# Patient Record
Sex: MALE | Race: WHITE
[De-identification: names, ages, dates, MRNs, and addresses within clinical notes are randomized per-mention and may not be internally consistent; named-entity substitution may affect disease eponyms.]

---

## 2017-12-30 ENCOUNTER — HOSPITAL ENCOUNTER (EMERGENCY)
Dept: HOSPITAL 89 - ER | Age: 22
Discharge: HOME | End: 2017-12-30
Payer: COMMERCIAL

## 2017-12-30 VITALS
HEIGHT: 60 IN | WEIGHT: 235 LBS | WEIGHT: 235 LBS | BODY MASS INDEX: 46.13 KG/M2 | HEIGHT: 60 IN | BODY MASS INDEX: 46.13 KG/M2

## 2017-12-30 VITALS — SYSTOLIC BLOOD PRESSURE: 133 MMHG | DIASTOLIC BLOOD PRESSURE: 71 MMHG

## 2017-12-30 DIAGNOSIS — B34.9: Primary | ICD-10-CM

## 2017-12-30 DIAGNOSIS — R59.1: ICD-10-CM

## 2017-12-30 LAB — PLATELET COUNT, AUTOMATED: 169 K/UL (ref 150–450)

## 2017-12-30 PROCEDURE — 82947 ASSAY GLUCOSE BLOOD QUANT: CPT

## 2017-12-30 PROCEDURE — 82247 BILIRUBIN TOTAL: CPT

## 2017-12-30 PROCEDURE — 87502 INFLUENZA DNA AMP PROBE: CPT

## 2017-12-30 PROCEDURE — 82040 ASSAY OF SERUM ALBUMIN: CPT

## 2017-12-30 PROCEDURE — 85025 COMPLETE CBC W/AUTO DIFF WBC: CPT

## 2017-12-30 PROCEDURE — 96360 HYDRATION IV INFUSION INIT: CPT

## 2017-12-30 PROCEDURE — 87040 BLOOD CULTURE FOR BACTERIA: CPT

## 2017-12-30 PROCEDURE — 86140 C-REACTIVE PROTEIN: CPT

## 2017-12-30 PROCEDURE — 82435 ASSAY OF BLOOD CHLORIDE: CPT

## 2017-12-30 PROCEDURE — 71020: CPT

## 2017-12-30 PROCEDURE — 81001 URINALYSIS AUTO W/SCOPE: CPT

## 2017-12-30 PROCEDURE — 84520 ASSAY OF UREA NITROGEN: CPT

## 2017-12-30 PROCEDURE — 84075 ASSAY ALKALINE PHOSPHATASE: CPT

## 2017-12-30 PROCEDURE — 99284 EMERGENCY DEPT VISIT MOD MDM: CPT

## 2017-12-30 PROCEDURE — 84460 ALANINE AMINO (ALT) (SGPT): CPT

## 2017-12-30 PROCEDURE — 84132 ASSAY OF SERUM POTASSIUM: CPT

## 2017-12-30 PROCEDURE — 86703 HIV-1/HIV-2 1 RESULT ANTBDY: CPT

## 2017-12-30 PROCEDURE — 82374 ASSAY BLOOD CARBON DIOXIDE: CPT

## 2017-12-30 PROCEDURE — 84295 ASSAY OF SERUM SODIUM: CPT

## 2017-12-30 PROCEDURE — 84450 TRANSFERASE (AST) (SGOT): CPT

## 2017-12-30 PROCEDURE — 82565 ASSAY OF CREATININE: CPT

## 2017-12-30 PROCEDURE — 82310 ASSAY OF CALCIUM: CPT

## 2017-12-30 PROCEDURE — 84155 ASSAY OF PROTEIN SERUM: CPT

## 2017-12-30 NOTE — RADIOLOGY IMAGING REPORT
FACILITY: [facility] 

 

PATIENT NAME: Epi Cook

: 1995

MR: [id number]

V: [id number]

EXAM DATE: 

ORDERING PHYSICIAN: JOÃO SAMUELS

TECHNOLOGIST: 

 

Location: [facility]

Patient: Epi Cook

: 1995

MRN: [id number]

Visit/Account:[id number]

Date of Sevice: 2017

 

ACCESSION #: [id number]

 

Examination: CHEST PA AND LAT

 

Comparison: None.

 

History: Fever and cough.

 

Findings: No consolidation, nodule, or peribronchial inflammation. No pneumothorax, edema, or effusio
n. Cardiac and hilar contour size is within normal limits. Osseous structures are intact.

 

IMPRESSION:

 

Negative chest.

 

Report Dictated By: Rogers Perkins MD at 2017 8:11 PM

 

Report E-Signed By: Rogers Perkins MD  at 2017 8:12 PM

 

WSN:[id number]

## 2017-12-30 NOTE — ER REPORT
History and Physical


Time Seen By MD:  18:57


Hx. of Stated Complaint:  


PT FEELING FEVERISH WITH CHILLS PAST 3 WEEKS, COUGHING UP YELLOW MUCUS, NASAL 


DISCHARGE, ON AUGMENTIN FROM URGENT CARE


HPI/ROS


CHIEF COMPLAINT: Fever





HISTORY OF PRESENT ILLNESS: 22-year-old male patient presents to the emergency 

room with complaint of a fever. Patient states that he hasn't seen multiple 

times for this. States that he has not had a fever until tonight. Prior to that 

he is having night sweats. He is also mostly has enlarged lymph nodes. He 

states that they are large however there non-tender. He denies any nausea, 

vomiting or diarrhea. He states that he has most recently been placed on 

Augmentin for possible sinusitis. He states that his heart rate is racing, that 

he does feel short of breath. He states that blood pressure is worse with 

orthostatics. He states that he had a mono test done yesterday which was 

negative. He does have a history of Joe-Infante in the past. Patient states he 

has had some body aches for the last couple of days. He did get a influenza 

vaccine.





REVIEW OF SYSTEMS:


Respiratory: As noted above


Cardiovascular: No chest pain, no palpitations.


Gastrointestinal: No vomiting, no abdominal pain.


Musculoskeletal: No back pain.


Allergies:  


Coded Allergies:  


     No Known Drug Allergies (Verified , 11/1/16)


Home Meds


Active Scripts


Ketorolac Tromethamine (KETOROLAC TROMETHAMINE) 10 Mg Tab, 10 MG PO Q6H, #20 TAB


   Prov:ABRILJOÃO FNP         12/30/17


Reported Medications


Naproxen (NAPROXEN) 500 Mg Tablet


   12/30/17


Amoxicillin/Potassium Clav (AMOX TR-K -125 MG TAB) 1 Each Tablet


   12/30/17


Discontinued Reported Medications


Prednisone (PREDNISONE) 20 Mg Tablet


   12/30/17


Discontinued Scripts


Prednisone 10 Mg Tab (PREDNISONE 10 MG TAB) 10 Mg Tablet, 10 MG PO QDAY, #10 TAB


   4 pills a day for one day, then 3 pills a day for one day, then 2


   pills a day for one day, then 1 pill a day for the last day.


   Prov:MAGUE MOORE MD         6/30/15


Hydrocodone Bit/Acetaminophen (HYDROCODON-ACETAMINOPHEN 5-325) 1 Each Tablet, 1 

EACH PO Q4-6H Y for pain, #0  


   Prov:MAGUE MOORE MD         6/30/15


Moxifloxacin HCl (Moxifloxacin HCl) 400 Mg Tablet, 400 MG PO QDAY, #10  


   Prov:MAGUE MOORE MD         6/30/15


Clindamycin Hcl (CLINDAMYCIN HCL) 300 Mg Capsule, 300 MG PO TID, #30 CAPSULE


   Take the 150mg and 300mg tablets together to make 450mg total.


   Prov:MAGUE MOORE MD         6/30/15


Clindamycin Hcl (CLINDAMYCIN HCL) 150 Mg Capsule, 150 MG PO TID, #30 CAPSULE


   Take the 150mg and 300mg tablets together to make 450mg total.


   Prov:MAGUE MOORE MD         6/30/15


Past Medical/Surgical History


Patient has a past medical history of astigmatism.


Patient has surgical history of tonsillectomy, left knee surgery.


Reviewed Nurses Notes:  Yes


Hx Smoking:  No


Smoking Status:  Never Smoker


Constitutional





Vital Sign - Last 24 Hours








 12/30/17 12/30/17 12/30/17 12/30/17





 18:54 18:58 19:05 19:20


 


Temp 99.2   


 


Pulse 115  105 ???


 


Resp 20   


 


B/P (MAP) 157/91 157/91 (113)  


 


Pulse Ox 95  97 94


 


O2 Delivery Room Air   


 


    





 12/30/17 12/30/17 12/30/17 12/30/17





 19:35 19:40 19:50 20:29


 


Temp    98.6


 


Pulse 105  100 


 


B/P (MAP)  123/77 (92)  


 


Pulse Ox 93  94 














Intake and Output   


 


 12/30/17 12/30/17 12/31/17





 15:00 23:00 07:00


 


Intake Total  1000 ml 


 


Balance  1000 ml 








Physical Exam


  General Appearance: The patient is alert, has no immediate need for airway 

protection and no current signs of toxicity.


ENT: Tympanic membranes are pearly-gray, auditory canals are patent, mucous 

membranes are moist.


Respiratory: Chest is non tender, lungs are clear to auscultation.


Cardiac: regular rhythm, patient is tachycardic.


Gastrointestinal: Abdomen is soft and non tender, no masses, bowel sounds 

normal.


Musculoskeletal:  Neck: Neck is supple and non tender.


   Extremities have full range of motion and are non tender.


Skin: No rashes or lesions.





DIFFERENTIAL DIAGNOSIS: After history and physical exam differential diagnosis 

was considered for fever in adults including but not limited to pneumonia, 

urinary tract infection, viral syndrome, and influenza.





Medical Decision Making


Data Points


Result Diagram:  


12/30/17 1922 12/30/17 1922





Laboratory





Hematology








Test


  12/30/17


18:51 12/30/17


19:22


 


Urine Color Vera  


 


Urine Clarity Cloudy  


 


Urine pH


  5.0 pH


(4.8-9.5) 


 


 


Urine Specific Gravity 1.031  


 


Urine Protein


  Negative mg/dL


(NEGATIVE) 


 


 


Urine Glucose (UA)


  Negative mg/dL


(NEGATIVE) 


 


 


Urine Ketones


  Negative mg/dL


(NEGATIVE) 


 


 


Urine Blood


  Negative


(NEGATIVE) 


 


 


Urine Nitrite


  Negative


(NEGATIVE) 


 


 


Urine Bilirubin


  Negative


(NEGATIVE) 


 


 


Urine Urobilinogen


  Negative mg/dL


(0.2-1.9) 


 


 


Urine Leukocyte Esterase


  Negative


(NEGATIVE) 


 


 


Urine RBC


  <1 /HPF


(0-2/HPF) 


 


 


Urine WBC


  3 /HPF


(0-5/HPF) 


 


 


Urine Squamous Epithelial


Cells Few /LPF


(</=FEW) 


 


 


Urine Calcium Oxalate Crystals


  Few /HPF


(NONE) 


 


 


Urine Bacteria


  Negative /HPF


(NONE-FEW) 


 


 


Urine Mucus


  Few /HPF


(NONE-FEW) 


 


 


Red Blood Count


  


  5.38 M/uL


(4.00-5.60)


 


Mean Corpuscular Volume


  


  87.0 fL


(80.0-96.0)


 


Mean Corpuscular Hemoglobin


  


  30.3 pg


(26.0-33.0)


 


Mean Corpuscular Hemoglobin


Concent 


  34.9 g/dL


(32.0-36.0)


 


Red Cell Distribution Width


  


  12.9 %


(11.5-14.5)


 


Mean Platelet Volume


  


  9.1 fL


(7.2-11.1)


 


Neutrophils (%) (Auto)   % (39.4-72.5) 


 


Lymphocytes (%) (Auto)   % (17.6-49.6) 


 


Monocytes (%) (Auto)   % (4.1-12.4) 


 


Eosinophils (%) (Auto)   % (0.4-6.7) 


 


Basophils (%) (Auto)   % (0.3-1.4) 


 


Nucleated RBC Relative Count


(auto) 


   /100WBC 


 


 


Neutrophils # (Auto)


  


  K/uL


(2.0-7.4)


 


Lymphocytes # (Auto)


  


  K/uL


(1.3-3.6)


 


Monocytes # (Auto)


  


  K/uL


(0.3-1.0)


 


Eosinophils # (Auto)


  


  K/uL


(0.0-0.5)


 


Basophils # (Auto)


  


  K/uL


(0.0-0.1)


 


Nucleated RBC Absolute Count


(auto) 


   K/uL 


 


 


Neutrophils % (Manual)


  


  61 %


(39.4-72.5)


 


Lymphocytes % (Manual)


  


  21 %


(17.6-49.6)


 


Atypical Lymphocytes %  5 % 


 


Monocytes % (Manual)


  


  10 %


(4.1-12.4)


 


Eosinophils % (Manual)  3 % (0.4-6.7) 


 


Basophils % (Manual)  0 % (0.3-1.4) 


 


Crete Cells  1+ 


 


Sodium Level


  


  140 mmol/L


(137-145)


 


Potassium Level


  


  3.8 mmol/L


(3.5-5.0)


 


Chloride Level


  


  105 mmol/L


()


 


Carbon Dioxide Level


  


  25 mmol/L


(22-30)


 


Blood Urea Nitrogen


  


  11 mg/dl


(9-21)


 


Creatinine


  


  0.80 mg/dl


(0.66-1.25)


 


Glomerular Filtration Rate


Calc 


  > 60.0 


 


 


Random Glucose


  


  82 mg/dl


()


 


Calcium Level


  


  8.6 mg/dl


(8.4-10.2)


 


Total Bilirubin


  


  0.9 mg/dl


(0.2-1.3)


 


Aspartate Amino Transf


(AST/SGOT) 


  96 U/L (0-35) 


 


 


Alanine Aminotransferase


(ALT/SGPT) 


  162 U/L (0-56) 


 


 


Alkaline Phosphatase  89 U/L (0-126) 


 


C-Reactive Protein


  


  4.5 mg/dl


(<1.0)


 


Total Protein


  


  7.2 gm/dl


(6.3-8.2)


 


Albumin


  


  3.8 g/dl


(3.5-5.0)


 


HIV (1&2) Antibody


  


  Negative


(NEGATIVE)


 


Influenza Type A Antigen


  


  Negative


(NEGATIVE)


 


Influenza Type B Antigen


  


  Negative


(NEGATIVE)








Chemistry








Test


  12/30/17


18:51 12/30/17


19:22


 


Urine Color Vera  


 


Urine Clarity Cloudy  


 


Urine pH


  5.0 pH


(4.8-9.5) 


 


 


Urine Specific Gravity 1.031  


 


Urine Protein


  Negative mg/dL


(NEGATIVE) 


 


 


Urine Glucose (UA)


  Negative mg/dL


(NEGATIVE) 


 


 


Urine Ketones


  Negative mg/dL


(NEGATIVE) 


 


 


Urine Blood


  Negative


(NEGATIVE) 


 


 


Urine Nitrite


  Negative


(NEGATIVE) 


 


 


Urine Bilirubin


  Negative


(NEGATIVE) 


 


 


Urine Urobilinogen


  Negative mg/dL


(0.2-1.9) 


 


 


Urine Leukocyte Esterase


  Negative


(NEGATIVE) 


 


 


Urine RBC


  <1 /HPF


(0-2/HPF) 


 


 


Urine WBC


  3 /HPF


(0-5/HPF) 


 


 


Urine Squamous Epithelial


Cells Few /LPF


(</=FEW) 


 


 


Urine Calcium Oxalate Crystals


  Few /HPF


(NONE) 


 


 


Urine Bacteria


  Negative /HPF


(NONE-FEW) 


 


 


Urine Mucus


  Few /HPF


(NONE-FEW) 


 


 


White Blood Count


  


  10.4 k/uL


(4.5-11.0)


 


Red Blood Count


  


  5.38 M/uL


(4.00-5.60)


 


Hemoglobin


  


  16.3 g/dL


(14.0-18.0)


 


Hematocrit


  


  46.9 %


(42.0-52.0)


 


Mean Corpuscular Volume


  


  87.0 fL


(80.0-96.0)


 


Mean Corpuscular Hemoglobin


  


  30.3 pg


(26.0-33.0)


 


Mean Corpuscular Hemoglobin


Concent 


  34.9 g/dL


(32.0-36.0)


 


Red Cell Distribution Width


  


  12.9 %


(11.5-14.5)


 


Platelet Count


  


  169 K/uL


(150-450)


 


Mean Platelet Volume


  


  9.1 fL


(7.2-11.1)


 


Neutrophils (%) (Auto)   % (39.4-72.5) 


 


Lymphocytes (%) (Auto)   % (17.6-49.6) 


 


Monocytes (%) (Auto)   % (4.1-12.4) 


 


Eosinophils (%) (Auto)   % (0.4-6.7) 


 


Basophils (%) (Auto)   % (0.3-1.4) 


 


Nucleated RBC Relative Count


(auto) 


   /100WBC 


 


 


Neutrophils # (Auto)


  


  K/uL


(2.0-7.4)


 


Lymphocytes # (Auto)


  


  K/uL


(1.3-3.6)


 


Monocytes # (Auto)


  


  K/uL


(0.3-1.0)


 


Eosinophils # (Auto)


  


  K/uL


(0.0-0.5)


 


Basophils # (Auto)


  


  K/uL


(0.0-0.1)


 


Nucleated RBC Absolute Count


(auto) 


   K/uL 


 


 


Neutrophils % (Manual)


  


  61 %


(39.4-72.5)


 


Lymphocytes % (Manual)


  


  21 %


(17.6-49.6)


 


Atypical Lymphocytes %  5 % 


 


Monocytes % (Manual)


  


  10 %


(4.1-12.4)


 


Eosinophils % (Manual)  3 % (0.4-6.7) 


 


Basophils % (Manual)  0 % (0.3-1.4) 


 


Darby Cells  1+ 


 


Glomerular Filtration Rate


Calc 


  > 60.0 


 


 


Calcium Level


  


  8.6 mg/dl


(8.4-10.2)


 


Total Bilirubin


  


  0.9 mg/dl


(0.2-1.3)


 


Aspartate Amino Transf


(AST/SGOT) 


  96 U/L (0-35) 


 


 


Alanine Aminotransferase


(ALT/SGPT) 


  162 U/L (0-56) 


 


 


Alkaline Phosphatase  89 U/L (0-126) 


 


C-Reactive Protein


  


  4.5 mg/dl


(<1.0)


 


Total Protein


  


  7.2 gm/dl


(6.3-8.2)


 


Albumin


  


  3.8 g/dl


(3.5-5.0)


 


HIV (1&2) Antibody


  


  Negative


(NEGATIVE)


 


Influenza Type A Antigen


  


  Negative


(NEGATIVE)


 


Influenza Type B Antigen


  


  Negative


(NEGATIVE)








Urinalysis








Test


  12/30/17


18:51


 


Urine Color Vera 


 


Urine Clarity Cloudy 


 


Urine pH


  5.0 pH


(4.8-9.5)


 


Urine Specific Gravity 1.031 


 


Urine Protein


  Negative mg/dL


(NEGATIVE)


 


Urine Glucose (UA)


  Negative mg/dL


(NEGATIVE)


 


Urine Ketones


  Negative mg/dL


(NEGATIVE)


 


Urine Blood


  Negative


(NEGATIVE)


 


Urine Nitrite


  Negative


(NEGATIVE)


 


Urine Bilirubin


  Negative


(NEGATIVE)


 


Urine Urobilinogen


  Negative mg/dL


(0.2-1.9)


 


Urine Leukocyte Esterase


  Negative


(NEGATIVE)


 


Urine RBC


  <1 /HPF


(0-2/HPF)


 


Urine WBC


  3 /HPF


(0-5/HPF)


 


Urine Squamous Epithelial


Cells Few /LPF


(</=FEW)


 


Urine Calcium Oxalate Crystals


  Few /HPF


(NONE)


 


Urine Bacteria


  Negative /HPF


(NONE-FEW)


 


Urine Mucus


  Few /HPF


(NONE-FEW)











EKG/Imaging


Imaging


Examination: CHEST PA AND LAT


 


Comparison: None.


 


History: Fever and cough.


 


Findings: No consolidation, nodule, or peribronchial inflammation. No 

pneumothorax, edema, or effusion. Cardiac and hilar contour size is within 

normal limits. Osseous structures are intact.


 


IMPRESSION:


 


Negative chest.


 


Report Dictated By: Rogers Perkins MD at 12/30/2017 8:11 PM


 


Report E-Signed By: Rogers Perkins MD  at 12/30/2017 8:12 PM





ED Course/Re-evaluation


ED Course


Patient was admitted to an exam room, history and physical were obtained. 

Differential diagnoses were considered. On examination patient does feel warm, 

lymph nodes aren't enlarged and nontender. Lungs are clear, heart is regular. A 

CBC, CMP, urinalysis, chest x-ray, CRP, blood cultures were obtained. Patient 

did have 3 white blood cells per high-power field on his urine. We'll go ahead 

and culture his urine. Blood cultures were also obtained. Patient had a normal 

white count with no left shift. CMP was unremarkable except the patient did 

have elevated AST and ALT. Chest x-ray was negative. I discussed findings with 

patient. I believe that we are likely looking at a virus. His wife is not 

having any improvement in his symptoms. I do have some concerns about enlarged 

lymph nodes that are nontender. Especially with that length of time patient has 

been feeling ill. I discussed my concerns with the patient and his mother. With 

his labs being normal I would strongly encourage following up with Dr. Ullrich 

for a lymph node biopsy. I discussed this with the patient who verbalized 

understanding and agreement.


Decision to Disposition Date:  Dec 30, 2017


Decision to Disposition Time:  21:08





Depart


Departure


Latest Vital Signs





Vital Signs








  Date Time  Temp Pulse Resp B/P (MAP) Pulse Ox O2 Delivery O2 Flow Rate FiO2


 


12/30/17 20:29 98.6       


 


12/30/17 19:50  100   94   


 


12/30/17 19:40    123/77 (92)    


 


12/30/17 18:54   20   Room Air  








Impression:  


 Primary Impression:  


 Fever


 Additional Impressions:  


 Viral syndrome


 Lymphadenopathy of head and neck


Condition:  Condition Unchanged


Disposition:  HOME OR SELF-CARE


Referrals:  


NATASHA CARLOS (PCP)











ULLRICH,JOHN A MD


New Scripts


Ketorolac Tromethamine (KETOROLAC TROMETHAMINE) 10 Mg Tab


10 MG PO Q6H, #20 TAB


   Prov: JOÃO SAMUELS         12/30/17


Patient Instructions:  Fever in Adults (ED), Lymphadenopathy (ED)





Additional Instructions:  


Increase fluid intake.


Get plenty of rest.


Limit activity by pain.


Take Tylenol as needed for fevers or pain.


Follow up with Dr. Ullrich for lymph node biopsy.


Continue with your current antibiotics.


Return to the ER if condition worsens.





Problem Qualifiers








 Primary Impression:  


 Fever


 Fever type:  unspecified  Qualified Codes:  R50.9 - Fever, unspecified








JOÃO SAMUELS Dec 30, 2017 18:58

## 2018-01-08 ENCOUNTER — HOSPITAL ENCOUNTER (OUTPATIENT)
Dept: HOSPITAL 89 - ZZSTITCHES | Age: 23
End: 2018-01-08
Attending: PHYSICIAN ASSISTANT
Payer: COMMERCIAL

## 2018-01-08 VITALS — BODY MASS INDEX: 31 KG/M2

## 2018-01-08 DIAGNOSIS — R59.0: Primary | ICD-10-CM

## 2018-01-08 DIAGNOSIS — R53.83: ICD-10-CM

## 2018-01-08 PROCEDURE — 86664 EPSTEIN-BARR NUCLEAR ANTIGEN: CPT

## 2018-01-08 PROCEDURE — 86644 CMV ANTIBODY: CPT

## 2018-01-08 PROCEDURE — 86663 EPSTEIN-BARR ANTIBODY: CPT

## 2018-01-08 PROCEDURE — 86665 EPSTEIN-BARR CAPSID VCA: CPT

## 2018-01-08 PROCEDURE — 86645 CMV ANTIBODY IGM: CPT

## 2018-05-13 ENCOUNTER — HOSPITAL ENCOUNTER (EMERGENCY)
Dept: HOSPITAL 89 - ER | Age: 23
Discharge: HOME | End: 2018-05-13
Payer: COMMERCIAL

## 2018-05-13 VITALS — DIASTOLIC BLOOD PRESSURE: 66 MMHG | SYSTOLIC BLOOD PRESSURE: 123 MMHG

## 2018-05-13 VITALS — BODY MASS INDEX: 31 KG/M2 | WEIGHT: 215 LBS

## 2018-05-13 DIAGNOSIS — S41.112A: Primary | ICD-10-CM

## 2018-05-13 PROCEDURE — 99283 EMERGENCY DEPT VISIT LOW MDM: CPT

## 2018-05-13 NOTE — ER REPORT
History and Physical


Time Seen By MD:  21:20


HPI/ROS


CHIEF COMPLAINT: Left arm laceration





HISTORY OF PRESENT ILLNESS: 22-year-old male presents ambulatory to the ER 

complaining of left arm laceration.  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  

Patient was sharpening a machete that fell off the table.  He tried to catch it 

and fell and was arm.  He sustained a superficial laceration just above the 

elbow on the upper outer arm.  Patient states his tetanus status is up-to-date.


Allergies:  


Coded Allergies:  


     No Known Drug Allergies (Verified , 5/13/18)


Home Meds


Discontinued Reported Medications


Naproxen (NAPROXEN) 500 Mg Tablet


   12/30/17


Amoxicillin/Potassium Clav (AMOX TR-K -125 MG TAB) 1 Each Tablet


   12/30/17


Discontinued Scripts


Ketorolac Tromethamine (KETOROLAC TROMETHAMINE) 10 Mg Tab, 10 MG PO Q6H, #20 TAB


   Prov:LINO SAMUELSSSJEAN PAUL         12/30/17


Reviewed Nurses Notes:  Yes


Old Medical Records Reviewed:  Yes


Hx Smoking:  No


Smoking Status:  Never Smoker


Hx Substance Use Disorder:  No


Constitutional





Vital Sign - Last 24 Hours








 5/13/18





 21:21


 


Temp 97.7


 


Pulse 84


 


Resp 16


 


B/P (MAP) 123/66


 


Pulse Ox 96








Physical Exam


   General appearance: Alert no distress.


Respiratory: Chest is non tender, lungs are clear to auscultation.


Cardiac: Regular rate and rhythm 


Extremities: Examination of left arm reveals a transverse 2.0 cm laceration 

just above the crease of the elbow on the outer aspect of the upper arm.  

Distal neurovascular functions intact.





DIFFERENTIAL DIAGNOSIS: After history and physical exam differential diagnosis 

was considered for arm laceration, tendon laceration, foreign body.





Medical Decision Making


ED Course/Re-evaluation


ED Course





Procedure:  Laceration repair.





Verbal consent was obtained from the patient.  The 2.0 cm laceration on the 

left upper arm was anesthetized in the usual fashion. The wound was scrubbed, 

draped and explored to its base with a gloved finger.  There were no deep 

structures involved.  No tendon injury was identified.  The wound was repaired 

with 4-0 Prolene 3 sutures.  The wound repair was simple.  The procedure was 

performed by myself.  Wound care was discussed, suture removal will be in 10 

days.


Decision to Disposition Date:  May 13, 2018


Decision to Disposition Time:  21:49





Depart


Departure


Latest Vital Signs





Vital Signs








  Date Time  Temp Pulse Resp B/P (MAP) Pulse Ox O2 Delivery O2 Flow Rate FiO2


 


5/13/18 21:21 97.7 84 16 123/66 96   








Impression:  


 Primary Impression:  


 Laceration of upper arm


Condition:  Improved


Disposition:  HOME OR SELF-CARE


New Scripts


No Active Prescriptions or Reported Meds


Patient Instructions:  Laceration (ED)





Additional Instructions:  


Watch for signs of infection


Perform daily wound care


Have sutures removed in 10 days





Problem Qualifiers








 Primary Impression:  


 Laceration of upper arm


 Encounter type:  initial encounter  Laterality:  left  Qualified Codes:  

S41.112A - Laceration without foreign body of left upper arm, initial encounter








GUERRERO HOGAN DO May 13, 2018 21:20